# Patient Record
Sex: MALE | Race: WHITE | NOT HISPANIC OR LATINO | Employment: OTHER | URBAN - METROPOLITAN AREA
[De-identification: names, ages, dates, MRNs, and addresses within clinical notes are randomized per-mention and may not be internally consistent; named-entity substitution may affect disease eponyms.]

---

## 2021-03-23 ENCOUNTER — TRANSFERRED RECORDS (OUTPATIENT)
Dept: HEALTH INFORMATION MANAGEMENT | Facility: CLINIC | Age: 56
End: 2021-03-23

## 2021-04-01 ENCOUNTER — TRANSFERRED RECORDS (OUTPATIENT)
Dept: HEALTH INFORMATION MANAGEMENT | Facility: CLINIC | Age: 56
End: 2021-04-01

## 2021-04-20 ENCOUNTER — TRANSFERRED RECORDS (OUTPATIENT)
Dept: HEALTH INFORMATION MANAGEMENT | Facility: CLINIC | Age: 56
End: 2021-04-20

## 2021-04-20 ENCOUNTER — MEDICAL CORRESPONDENCE (OUTPATIENT)
Dept: HEALTH INFORMATION MANAGEMENT | Facility: CLINIC | Age: 56
End: 2021-04-20

## 2021-04-21 ENCOUNTER — TRANSCRIBE ORDERS (OUTPATIENT)
Dept: OTHER | Age: 56
End: 2021-04-21

## 2021-04-21 DIAGNOSIS — H53.2 DIPLOPIA: Primary | ICD-10-CM

## 2021-04-21 DIAGNOSIS — H40.003 GLAUCOMA SUSPECT, BILATERAL: ICD-10-CM

## 2021-04-21 DIAGNOSIS — H35.033 HYPERTENSIVE RETINOPATHY OF BOTH EYES: ICD-10-CM

## 2021-05-25 ENCOUNTER — TELEPHONE (OUTPATIENT)
Dept: NEUROSURGERY | Facility: CLINIC | Age: 56
End: 2021-05-25

## 2021-06-25 ENCOUNTER — OFFICE VISIT (OUTPATIENT)
Dept: NEUROSURGERY | Facility: CLINIC | Age: 56
End: 2021-06-25
Payer: COMMERCIAL

## 2021-06-25 VITALS
HEART RATE: 80 BPM | SYSTOLIC BLOOD PRESSURE: 111 MMHG | WEIGHT: 240 LBS | DIASTOLIC BLOOD PRESSURE: 70 MMHG | RESPIRATION RATE: 16 BRPM

## 2021-06-25 DIAGNOSIS — M79.601 RIGHT ARM PAIN: Primary | ICD-10-CM

## 2021-06-25 PROCEDURE — 99214 OFFICE O/P EST MOD 30 MIN: CPT | Performed by: NEUROLOGICAL SURGERY

## 2021-06-25 RX ORDER — LISINOPRIL AND HYDROCHLOROTHIAZIDE 20; 25 MG/1; MG/1
TABLET ORAL
COMMUNITY
Start: 2020-09-14

## 2021-06-25 RX ORDER — LORATADINE 10 MG/1
TABLET ORAL
COMMUNITY
Start: 2021-04-15

## 2021-06-25 RX ORDER — ASCORBIC ACID 500 MG
TABLET ORAL
COMMUNITY
Start: 2020-09-30

## 2021-06-25 RX ORDER — BUPROPION HYDROCHLORIDE 100 MG/1
100 TABLET ORAL
COMMUNITY

## 2021-06-25 RX ORDER — TESTOSTERONE CYPIONATE 200 MG/ML
INJECTION, SOLUTION INTRAMUSCULAR
COMMUNITY
Start: 2021-04-06

## 2021-06-25 RX ORDER — SERTRALINE HYDROCHLORIDE 100 MG/1
TABLET, FILM COATED ORAL
COMMUNITY
Start: 2021-06-01

## 2021-06-25 RX ORDER — BUSPIRONE HYDROCHLORIDE 5 MG/1
5 TABLET ORAL
COMMUNITY

## 2021-06-25 RX ORDER — GABAPENTIN 300 MG/1
CAPSULE ORAL
COMMUNITY
Start: 2021-04-06

## 2021-06-25 RX ORDER — CHLORAL HYDRATE 500 MG
2 CAPSULE ORAL
COMMUNITY

## 2021-06-25 RX ORDER — TAMSULOSIN HYDROCHLORIDE 0.4 MG/1
CAPSULE ORAL
COMMUNITY
Start: 2021-05-26

## 2021-06-25 RX ORDER — MULTIVITAMIN,THERAPEUTIC
1 TABLET ORAL
COMMUNITY

## 2021-06-25 RX ORDER — ACETAMINOPHEN 500 MG
TABLET ORAL
COMMUNITY
Start: 2020-09-14

## 2021-06-25 RX ORDER — ATORVASTATIN CALCIUM 40 MG/1
TABLET, FILM COATED ORAL
COMMUNITY
Start: 2020-09-14

## 2021-06-25 RX ORDER — ATENOLOL 25 MG/1
TABLET ORAL
COMMUNITY
Start: 2020-09-14

## 2021-06-25 NOTE — LETTER
6/25/2021         RE: Chiki Choe  [address]  [place] [address]        Dear Colleague,    Thank you for referring your patient, Chiki Choe, to the [facility] NEUROSURGERY CLINIC [place]. Please see a copy of my visit note below.    Mr. Wallace is a 55-year-old man seen today for evaluation of right-sided neck pain extending into the dorsum of his right shoulder.  His history is complicated by prior cervical surgery for reportedly the same symptoms 3 years ago.  And that surgery performed in [place] by Dr. Hannah a two-level cervical arthroplasty was performed at C4-5 and C5-6 reportedly.  Patient notes that subsequent to surgery that her symptoms transiently improved but then regressed to the previous level of severity.  He denies any significant interval change in the character or severity of his symptoms.  His problem is further complicated by significant bilateral shoulder pathology.  He has recently undergone a rotator cuff repair on the right shoulder and is still wearing a sling.  This significantly limits the neurologic examination available presently.  He also reports that as a consequence of his cervical surgery that he suffered a paralyzed right hemidiaphragm reportedly diagnosed with thoracic fluoroscopy.  He has not returned to see the operating surgeon since the time of surgery.    He localizes his pain to the right lateral cervical region extending near the clavicle and anterior dorsum of his right shoulder.  There is also some intermittent extension along his right anterior biceps but no spread below the elbow.  He notes intermittent numbness into the last 2 fingers of his right hand at night when he is wearing the sling, almost certainly a transient issue with his ulnar nerve.  He has minimal complaint of symptoms toward the left arm and denies any symptoms in the left C8 or ulnar distribution toward the left side.    Neurologic examination is  mostly deferred today because of the inability to complete a meaningful motor and sensory examination secondary to his recent right shoulder surgery.    We were able to obtain a cervical MRI scan obtained at [facility] in May 2021.  There is moderate magnetic artifact from the presence of cervical arthroplasty, Mobi-C implants, at C4-5 and C5-6.  On axial images he appears to have persistent moderate cervical foraminal stenosis toward the right side at both levels.  There may be a persistent osteophyte or chronic disc herniation across the canal at the C4-5 level although this is difficult to distinguish because of magnetic artifact.  He appears to have significant left C7-T1 foraminal stenosis.  There does not appear to be a clinical correlate to this radiographic finding.    Assessment: Persistent chronic proximal right shoulder and neck pain with some extension into the proximal right upper extremity of undetermined etiology in this individual with history of significant right shoulder pathology, recent rotator cuff surgery as well as reportedly ineffective decompression with placement of cervical arthroplasty at C4-5 and C5-6.  I have reviewed the differential diagnosis and management alternatives at some length with the patient and in the office.  I have recommended that we initially proceed with a noncontrast cervical CT scan to better understand the bony confines of the neural foramen at C4-5 and C5-6 as well as to get a better picture of the potential osteophyte across the canal at C4-5.  I told him that I believe it is unlikely that additional surgical intervention will be helpful then reassured him that his alignment as best can be determined on the cervical MRI scan and his spinal canal appear nearly normal.  There is no evidence of cord impingement or significant cervical spinal canal stenosis and therefore no possibility of progressive or devastating neurologic deficit in the future.    I will plan to  see him back in the office as soon as the cervical CT scan is available for review.    It is noted that I had previously operated on his wife years ago for a cervical problem and she will likely accompany him to the next office visit.    Approximately 25 minutes was spent direct contact with the patient the majority reviewing clinical and radiographic findings, history, differential diagnosis, management alternatives, risks and benefits.      Again, thank you for allowing me to participate in the care of your patient.        Sincerely,        Gonzalez Solano MD

## 2021-06-25 NOTE — PROGRESS NOTES
Mr. Wallace is a 55-year-old man seen today for evaluation of right-sided neck pain extending into the dorsum of his right shoulder.  His history is complicated by prior cervical surgery for reportedly the same symptoms 3 years ago.  And that surgery performed in [place] by Dr. Hannah a two-level cervical arthroplasty was performed at C4-5 and C5-6 reportedly.  Patient notes that subsequent to surgery that her symptoms transiently improved but then regressed to the previous level of severity.  He denies any significant interval change in the character or severity of his symptoms.  His problem is further complicated by significant bilateral shoulder pathology.  He has recently undergone a rotator cuff repair on the right shoulder and is still wearing a sling.  This significantly limits the neurologic examination available presently.  He also reports that as a consequence of his cervical surgery that he suffered a paralyzed right hemidiaphragm reportedly diagnosed with thoracic fluoroscopy.  He has not returned to see the operating surgeon since the time of surgery.    He localizes his pain to the right lateral cervical region extending near the clavicle and anterior dorsum of his right shoulder.  There is also some intermittent extension along his right anterior biceps but no spread below the elbow.  He notes intermittent numbness into the last 2 fingers of his right hand at night when he is wearing the sling, almost certainly a transient issue with his ulnar nerve.  He has minimal complaint of symptoms toward the left arm and denies any symptoms in the left C8 or ulnar distribution toward the left side.    Neurologic examination is mostly deferred today because of the inability to complete a meaningful motor and sensory examination secondary to his recent right shoulder surgery.    We were able to obtain a cervical MRI scan obtained at [facility] in May 2021.  There is moderate magnetic artifact from the  presence of cervical arthroplasty, Mobi-C implants, at C4-5 and C5-6.  On axial images he appears to have persistent moderate cervical foraminal stenosis toward the right side at both levels.  There may be a persistent osteophyte or chronic disc herniation across the canal at the C4-5 level although this is difficult to distinguish because of magnetic artifact.  He appears to have significant left C7-T1 foraminal stenosis.  There does not appear to be a clinical correlate to this radiographic finding.    Assessment: Persistent chronic proximal right shoulder and neck pain with some extension into the proximal right upper extremity of undetermined etiology in this individual with history of significant right shoulder pathology, recent rotator cuff surgery as well as reportedly ineffective decompression with placement of cervical arthroplasty at C4-5 and C5-6.  I have reviewed the differential diagnosis and management alternatives at some length with the patient and in the office.  I have recommended that we initially proceed with a noncontrast cervical CT scan to better understand the bony confines of the neural foramen at C4-5 and C5-6 as well as to get a better picture of the potential osteophyte across the canal at C4-5.  I told him that I believe it is unlikely that additional surgical intervention will be helpful then reassured him that his alignment as best can be determined on the cervical MRI scan and his spinal canal appear nearly normal.  There is no evidence of cord impingement or significant cervical spinal canal stenosis and therefore no possibility of progressive or devastating neurologic deficit in the future.    I will plan to see him back in the office as soon as the cervical CT scan is available for review.    It is noted that I had previously operated on his wife years ago for a cervical problem and she will likely accompany him to the next office visit.    Approximately 25 minutes was spent direct  contact with the patient the majority reviewing clinical and radiographic findings, history, differential diagnosis, management alternatives, risks and benefits.

## 2021-07-13 ENCOUNTER — OFFICE VISIT (OUTPATIENT)
Dept: NEUROSURGERY | Facility: CLINIC | Age: 56
End: 2021-07-13
Attending: NEUROLOGICAL SURGERY
Payer: COMMERCIAL

## 2021-07-13 VITALS
OXYGEN SATURATION: 95 % | HEART RATE: 82 BPM | WEIGHT: 240 LBS | SYSTOLIC BLOOD PRESSURE: 112 MMHG | RESPIRATION RATE: 16 BRPM | DIASTOLIC BLOOD PRESSURE: 72 MMHG

## 2021-07-13 DIAGNOSIS — M47.812 CERVICAL SPONDYLOSIS: Primary | ICD-10-CM

## 2021-07-13 PROCEDURE — 99213 OFFICE O/P EST LOW 20 MIN: CPT | Performed by: NEUROLOGICAL SURGERY

## 2021-07-13 PROCEDURE — G0463 HOSPITAL OUTPT CLINIC VISIT: HCPCS

## 2021-07-13 ASSESSMENT — PAIN SCALES - GENERAL: PAINLEVEL: NO PAIN (1)

## 2021-07-13 NOTE — PATIENT INSTRUCTIONS
Patient Next Steps:      Dr. Solano would like to see you back in the clinic for follow up in 1 year. He would like you to obtain a non-contract cervical CT prior to your visit. Please call the number below to schedule next July.       Please call us if you have any further questions or concerns.    [facility]   Phone: [phone]  Fax: [phone]

## 2021-07-13 NOTE — LETTER
7/13/2021         RE: Chiki Choe  [address]  [place] [address]        Dear Colleague,    Thank you for referring your patient, Chiki Choe, to the [facility] NEUROSURGERY CLINIC [place]. Please see a copy of my visit note below.    Mr. Suraez's returns following a noncontrast CT scan of his cervical spine.  This study is reviewed today and shows implanted cervical arthroplasty at C4-5 and C5-6 which appear to be solidly implanted.  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  This produces moderate spinal canal stenosis but no current evidence clinically or radiographically of cervical myelopathy.  Preoperative MRI studies are not available for review.  There is also noted to be a small osteophyte at right C3-4 which may have produced some of the patient's symptoms into the right shoulder and neck in the past.  Fortunately, currently has complaints of neck and right arm pain are quite minimal and intermittent.  He denies functionally limiting pain or neurologic deficit involving his dominant right upper extremity.  He is dealing with several other issues including intractable right knee pain and recent shoulder surgery from which he is making a good recovery.    I reviewed the clinical and radiographic findings in detail with the patient today in the office.  I told him that based upon his clinical status and lack of significant or continuing symptoms that I would not recommend additional surgery.  Potentially, if this were at C4-5 continues to propagate, removal of the arthroplasty at that level with at least a partial vertebrectomy may be needed.  I have asked him to return in 1 year with a repeat noncontrast cervical CT scan at that time to allow radiographic comparison and  clinical follow-up to ensure he is not developing myelopathy.    Approximately 20 minutes was spent in total reviewing the recent cervical CT scan and discussing management alternatives, risks and benefits.      Again, thank you for allowing me to participate in the care of your patient.        Sincerely,        Gonzalez Solano MD

## 2021-07-13 NOTE — PROGRESS NOTES
Mr. Suarez's returns following a noncontrast CT scan of his cervical spine.  This study is reviewed today and shows implanted cervical arthroplasty at C4-5 and C5-6 which appear to be solidly implanted.  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  This produces moderate spinal canal stenosis but no current evidence clinically or radiographically of cervical myelopathy.  Preoperative MRI studies are not available for review.  There is also noted to be a small osteophyte at right C3-4 which may have produced some of the patient's symptoms into the right shoulder and neck in the past.  Fortunately, currently has complaints of neck and right arm pain are quite minimal and intermittent.  He denies functionally limiting pain or neurologic deficit involving his dominant right upper extremity.  He is dealing with several other issues including intractable right knee pain and recent shoulder surgery from which he is making a good recovery.    I reviewed the clinical and radiographic findings in detail with the patient today in the office.  I told him that based upon his clinical status and lack of significant or continuing symptoms that I would not recommend additional surgery.  Potentially, if this were at C4-5 continues to propagate, removal of the arthroplasty at that level with at least a partial vertebrectomy may be needed.  I have asked him to return in 1 year with a repeat noncontrast cervical CT scan at that time to allow radiographic comparison and clinical follow-up to ensure he is not developing myelopathy.    Approximately 20 minutes was spent in total reviewing the recent cervical CT scan and discussing management alternatives, risks and benefits.

## 2021-07-19 ENCOUNTER — TRANSFERRED RECORDS (OUTPATIENT)
Dept: HEALTH INFORMATION MANAGEMENT | Facility: CLINIC | Age: 56
End: 2021-07-19

## 2021-07-19 ENCOUNTER — MEDICAL CORRESPONDENCE (OUTPATIENT)
Dept: HEALTH INFORMATION MANAGEMENT | Facility: CLINIC | Age: 56
End: 2021-07-19

## 2021-08-26 ENCOUNTER — OFFICE VISIT (OUTPATIENT)
Dept: OPHTHALMOLOGY | Facility: CLINIC | Age: 56
End: 2021-08-26
Attending: OPHTHALMOLOGY
Payer: COMMERCIAL

## 2021-08-26 DIAGNOSIS — H40.003 GLAUCOMA SUSPECT, BILATERAL: ICD-10-CM

## 2021-08-26 DIAGNOSIS — H35.033 HYPERTENSIVE RETINOPATHY OF BOTH EYES: ICD-10-CM

## 2021-08-26 DIAGNOSIS — H53.2 DIPLOPIA: ICD-10-CM

## 2021-08-26 DIAGNOSIS — H53.10 SUBJECTIVE VISUAL DISTURBANCE: Primary | ICD-10-CM

## 2021-08-26 DIAGNOSIS — H50.612 BROWN SYNDROME, LEFT EYE: Primary | ICD-10-CM

## 2021-08-26 PROCEDURE — 92060 SENSORIMOTOR EXAMINATION: CPT | Performed by: OPHTHALMOLOGY

## 2021-08-26 PROCEDURE — G0463 HOSPITAL OUTPT CLINIC VISIT: HCPCS | Mod: 25

## 2021-08-26 PROCEDURE — 99204 OFFICE O/P NEW MOD 45 MIN: CPT | Performed by: OPHTHALMOLOGY

## 2021-08-26 PROCEDURE — 92015 DETERMINE REFRACTIVE STATE: CPT

## 2021-08-26 ASSESSMENT — CONF VISUAL FIELD
OD_NORMAL: 1
OS_NORMAL: 1
METHOD: COUNTING FINGERS

## 2021-08-26 ASSESSMENT — MARGIN REFLEX DISTANCE
OS_MRD1: 0.5
OD_MRD1: 1

## 2021-08-26 ASSESSMENT — VISUAL ACUITY
METHOD: SNELLEN - LINEAR
OS_SC+: -1
OD_SC: 20/20
OD_SC+: -2
OS_SC: 20/20

## 2021-08-26 ASSESSMENT — CUP TO DISC RATIO
OS_RATIO: 0.5
OD_RATIO: 0.4

## 2021-08-26 ASSESSMENT — TONOMETRY
OD_IOP_MMHG: 09
OS_IOP_MMHG: 08
IOP_METHOD: ICARE

## 2021-08-26 ASSESSMENT — EXTERNAL EXAM - RIGHT EYE: OD_EXAM: NORMAL

## 2021-08-26 ASSESSMENT — SLIT LAMP EXAM - LIDS
COMMENTS: MODERATE BLEPHARITIS
COMMENTS: MODERATE BLEPHARITIS

## 2021-08-26 ASSESSMENT — EXTERNAL EXAM - LEFT EYE: OS_EXAM: NORMAL
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
## 2021-08-27 ENCOUNTER — TELEPHONE (OUTPATIENT)
Dept: OPHTHALMOLOGY | Facility: CLINIC | Age: 56
End: 2021-08-27

## 2021-08-27 NOTE — TELEPHONE ENCOUNTER
Spoke to patient.  Dr. Franco spoke with the strabismus surgeon and unfortunately surgery is not an option.  Patient should call us if he has worsening of symptoms.  Otherwise will be in touch after the lab results come back.     Mary Mcneal on 8/27/2021 at 12:31 PM

## 2021-09-01 ENCOUNTER — TELEPHONE (OUTPATIENT)
Dept: OPHTHALMOLOGY | Facility: CLINIC | Age: 56
End: 2021-09-01

## 2021-09-01 DIAGNOSIS — H50.612 BROWN SYNDROME, LEFT EYE: Primary | ICD-10-CM

## 2021-09-01 DIAGNOSIS — Z87.898 HISTORY OF ELEVATED ANTINUCLEAR ANTIBODY (ANA): ICD-10-CM

## 2021-09-01 NOTE — TELEPHONE ENCOUNTER
Left message for patient.  Rheumatoid factor lab was normal.  TREMAINE was only slightly elevated and Dr. Franco is not sure rheumatology would work up any further but can see if patient would like.  Asked him to call me if he would like to see rheumatology.     Mary Mcneal on 9/1/2021 at 3:53 PM

## 2021-09-02 NOTE — TELEPHONE ENCOUNTER
Spoke to patient.  He is interested in rheumatology referral.  He would like to go closer to his home in [place]  Order placed and faxed per request.     Mary Mcneal on 9/2/2021 at 12:47 PM

## 2021-10-11 ENCOUNTER — HEALTH MAINTENANCE LETTER (OUTPATIENT)
Age: 56
End: 2021-10-11

## 2022-07-01 ENCOUNTER — TELEPHONE (OUTPATIENT)
Dept: NEUROSURGERY | Facility: CLINIC | Age: 57
End: 2022-07-01

## 2022-07-01 DIAGNOSIS — M54.12 CERVICAL RADICULOPATHY: Primary | ICD-10-CM

## 2022-07-01 NOTE — TELEPHONE ENCOUNTER
Order non-contrast CT cervical and remind pt to schedule rita Solano to review results. Order placed and referral faxed to [facility] as per patient's request.     Attempted to reach out to patient, no answer. Left voice message for patient to call clinic back and let us know when CT gets scheduled so we can get images and coordinate appointment with Dr Solano.

## 2022-07-01 NOTE — TELEPHONE ENCOUNTER
Faxed CT Cervical referral to the [facility] July 1, 2022 to fax number [phone]    Right Fax confirmed at 104 PM    Attempted to reach out to patient, no answer. Left voice message for patient to call clinic back if any issues.

## 2022-07-01 NOTE — TELEPHONE ENCOUNTER
Patient called stating needs a 1 yr follow up with surgeon, how ever needs the CT order to be sent to Dr. Fredy Tom at [facility]. Phone [phone].   Please contact Edison when done [phone].   Thank you

## 2022-07-19 ENCOUNTER — OFFICE VISIT (OUTPATIENT)
Dept: NEUROSURGERY | Facility: CLINIC | Age: 57
End: 2022-07-19
Payer: COMMERCIAL

## 2022-07-19 VITALS
OXYGEN SATURATION: 96 % | HEART RATE: 91 BPM | BODY MASS INDEX: 36.14 KG/M2 | SYSTOLIC BLOOD PRESSURE: 102 MMHG | DIASTOLIC BLOOD PRESSURE: 69 MMHG | HEIGHT: 69 IN | WEIGHT: 244 LBS

## 2022-07-19 DIAGNOSIS — M54.12 CERVICAL RADICULOPATHY: ICD-10-CM

## 2022-07-19 DIAGNOSIS — M47.812 CERVICAL SPONDYLOSIS: Primary | ICD-10-CM

## 2022-07-19 PROCEDURE — 99213 OFFICE O/P EST LOW 20 MIN: CPT | Performed by: NEUROLOGICAL SURGERY

## 2022-07-19 ASSESSMENT — PAIN SCALES - GENERAL: PAINLEVEL: NO PAIN (1)

## 2022-07-19 NOTE — NURSING NOTE
"Chiki Choe is a 56 year old male who presents for:  Chief Complaint   Patient presents with     Consult     Cervical radiculopathy        Initial Vitals:  /69   Pulse 91   Ht 5' 9\" (1.753 m)   Wt 244 lb (110.7 kg)   SpO2 96%   BMI 36.03 kg/m   Estimated body mass index is 36.03 kg/m  as calculated from the following:    Height as of this encounter: 5' 9\" (1.753 m).    Weight as of this encounter: 244 lb (110.7 kg).. Body surface area is 2.32 meters squared. BP completed using cuff size: large  No Pain (1)    Nursing Comments:     Nino Johnson    "

## 2022-07-19 NOTE — PROGRESS NOTES
Mr. Choe is seen in scheduled follow-up for radiographic review of the cervical CT scan.  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  He has multiple complaints of difficulty with ambulation, weakness of his upper extremities, etc.  These appear to be longstanding problems and there has been no reported new weakness or recent functional limitation.  By history there is no indication of myelopathic symptoms and no complaint of symptoms consistent with upper extremity radiculopathy.  He has some what limited cervical range of motion with minimal chronic pain complaint.  This also appears to be unchanged.    On examination, he is awake alert and oriented x3 with memory intact for recent and remote events and speech clear in character and content.  Cervical range of motion is slightly limited in flexion and rotation but is nonpainful.  He reports that this limitation has not changed.  Gait is normal without ataxic component.  He is capable of tandem gait.  Examination of his bilateral upper extremities shows 5/5 strength throughout including hand intrinsics, biceps, triceps and brachioradialis bilaterally.  Deltoids are also equal and strong.  There is no evidence of dermatomal sensory disturbance.  Lower extremity strength is intact.  Deep tendon reflexes throughout are 1+ and equal, nearly absent at the ankles bilaterally and symmetrically.  There is no ankle clonus noted and plantar signs are absent.    A recent noncontrast cervical CT scan on July 1, 2022 is reviewed and compared to a similar study from June 25, 2021.  There is ossification of the residual posterior longitudinal ligament behind the arthroplasty at C4-5 on the right side.  This is central and slightly eccentric toward the right side.  It does not appear to produce severe spinal canal stenosis nor foraminal stenosis.  In  comparison with study obtained here earlier this calcific density appears to have consolidated slightly and may be 1 to 2 mm thicker than previously.    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  The operating surgeon at that time reported an autofusion at the C4-5 level but persisted with arthroplasty placement anyway.  It appears that there is no residual possibility of motion at the C4-5 level and that this level has effectively fused.  At the C5-6 level I believe the arthroplasty is likely functioning.    And there is no clinical evidence of cervical radiculopathy or myelopathy and the patient's function remains quite good.  I have reviewed the clinical and radiographic findings with him in detail and discussed management alternatives once again.  I told him that removal of the ossification behind the C4-5 arthroplasty would require partial vertebrectomies of C4 and C5 likely with placement of a strut graft and an anterior cervical plate followed by at least 3 to 4 months off work.  Given the absence of significant symptoms or functional limitations patient is understandably reluctant to consider additional surgery.  I have reviewed symptoms and signs of cervical myelopathy with him and told him that should he develop difficulty with ambulation, fine motor movement or strength in his upper extremities or return of radicular symptoms into his arms that he should call or return to the office for reevaluation without delay.  Otherwise, we will plan on a repeat cervical CT scan in 1 year to allow comparison study.  When this calcification appears stable, the need for further radiographic follow-up will be unnecessary unless the patient is developing symptoms potentially related to this problem.    Approximately 25 minutes in total was spent with Mr. Suaerz's the majority reviewing the clinical record, evaluating him and discussing differential diagnosis and  management alternatives, risks and benefits.

## 2022-07-19 NOTE — LETTER
7/19/2022         RE: Chiki Choe  [address]  [place] [address]        Dear Colleague,    Thank you for referring your patient, Chiki Choe, to the [facility] NEUROLOGY CLINICS [facility]. Please see a copy of my visit note below.    Mr. Choe is seen in scheduled follow-up for radiographic review of the cervical CT scan.  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  He has multiple complaints of difficulty with ambulation, weakness of his upper extremities, etc.  These appear to be longstanding problems and there has been no reported new weakness or recent functional limitation.  By history there is no indication of myelopathic symptoms and no complaint of symptoms consistent with upper extremity radiculopathy.  He has some what limited cervical range of motion with minimal chronic pain complaint.  This also appears to be unchanged.    On examination, he is awake alert and oriented x3 with memory intact for recent and remote events and speech clear in character and content.  Cervical range of motion is slightly limited in flexion and rotation but is nonpainful.  He reports that this limitation has not changed.  Gait is normal without ataxic component.  He is capable of tandem gait.  Examination of his bilateral upper extremities shows 5/5 strength throughout including hand intrinsics, biceps, triceps and brachioradialis bilaterally.  Deltoids are also equal and strong.  There is no evidence of dermatomal sensory disturbance.  Lower extremity strength is intact.  Deep tendon reflexes throughout are 1+ and equal, nearly absent at the ankles bilaterally and symmetrically.  There is no ankle clonus noted and plantar signs are absent.    A recent noncontrast cervical CT scan on July 1, 2022 is reviewed and compared to a similar study from June 25,  2021.  There is ossification of the residual posterior longitudinal ligament behind the arthroplasty at C4-5 on the right side.  This is central and slightly eccentric toward the right side.  It does not appear to produce severe spinal canal stenosis nor foraminal stenosis.  In comparison with study obtained here earlier this calcific density appears to have consolidated slightly and may be 1 to 2 mm thicker than previously.    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  The operating surgeon at that time reported an autofusion at the C4-5 level but persisted with arthroplasty placement anyway.  It appears that there is no residual possibility of motion at the C4-5 level and that this level has effectively fused.  At the C5-6 level I believe the arthroplasty is likely functioning.    And there is no clinical evidence of cervical radiculopathy or myelopathy and the patient's function remains quite good.  I have reviewed the clinical and radiographic findings with him in detail and discussed management alternatives once again.  I told him that removal of the ossification behind the C4-5 arthroplasty would require partial vertebrectomies of C4 and C5 likely with placement of a strut graft and an anterior cervical plate followed by at least 3 to 4 months off work.  Given the absence of significant symptoms or functional limitations patient is understandably reluctant to consider additional surgery.  I have reviewed symptoms and signs of cervical myelopathy with him and told him that should he develop difficulty with ambulation, fine motor movement or strength in his upper extremities or return of radicular symptoms into his arms that he should call or return to the office for reevaluation without delay.  Otherwise, we will plan on a repeat cervical CT scan in 1 year to allow comparison study.  When this calcification appears stable, the need for further radiographic  follow-up will be unnecessary unless the patient is developing symptoms potentially related to this problem.    Approximately 25 minutes in total was spent with Mr. Suarez's the majority reviewing the clinical record, evaluating him and discussing differential diagnosis and management alternatives, risks and benefits.      Again, thank you for allowing me to participate in the care of your patient.        Sincerely,        Gonzalez Solano MD

## 2022-09-25 ENCOUNTER — HEALTH MAINTENANCE LETTER (OUTPATIENT)
Age: 57
End: 2022-09-25

## 2023-01-30 ENCOUNTER — HEALTH MAINTENANCE LETTER (OUTPATIENT)
Age: 58
End: 2023-01-30

## 2023-06-01 NOTE — CONFIDENTIAL NOTE
Patient was last seen in clinic by Dr. Solano on 7/19/2022 refer to note for details.     Per Dr. Solano, patient is due for repeat cervical CT w/o contrast in July 2023.     Need to update and clarify with patient where he would like to obtain the cervical CT. Has had imaging previously at [facility] in [place] and at the [facility].     Attempted to reach out to patient, no answer. Left voice message asking patient to call clinic back to further discuss.     Place order accordingly once patient calls back on location he wants it done at.

## 2023-06-29 ENCOUNTER — TELEPHONE (OUTPATIENT)
Dept: NEUROSURGERY | Facility: CLINIC | Age: 58
End: 2023-06-29

## 2023-06-29 DIAGNOSIS — M47.812 CERVICAL SPONDYLOSIS: Primary | ICD-10-CM

## 2023-06-29 DIAGNOSIS — M54.12 CERVICAL RADICULOPATHY: ICD-10-CM

## 2023-06-29 NOTE — TELEPHONE ENCOUNTER
[facility].

## 2023-06-29 NOTE — CONFIDENTIAL NOTE
Order placed for repeat cervical CT w/o contrast per chart review.     Encounter routed to [facility] team to assist with faxing.

## 2023-07-06 ENCOUNTER — ANCILLARY PROCEDURE (OUTPATIENT)
Dept: CT IMAGING | Facility: CLINIC | Age: 58
End: 2023-07-06
Attending: NEUROLOGICAL SURGERY
Payer: COMMERCIAL

## 2023-07-06 DIAGNOSIS — M54.12 CERVICAL RADICULOPATHY: ICD-10-CM

## 2023-07-06 DIAGNOSIS — M47.812 CERVICAL SPONDYLOSIS: ICD-10-CM

## 2023-07-06 PROCEDURE — 72125 CT NECK SPINE W/O DYE: CPT | Performed by: RADIOLOGY

## 2023-07-28 ENCOUNTER — OFFICE VISIT (OUTPATIENT)
Dept: NEUROSURGERY | Facility: CLINIC | Age: 58
End: 2023-07-28
Payer: COMMERCIAL

## 2023-07-28 VITALS
BODY MASS INDEX: 36.14 KG/M2 | WEIGHT: 244 LBS | HEIGHT: 69 IN | SYSTOLIC BLOOD PRESSURE: 101 MMHG | HEART RATE: 88 BPM | DIASTOLIC BLOOD PRESSURE: 71 MMHG

## 2023-07-28 DIAGNOSIS — Z98.890 S/P CERVICAL DISC REPLACEMENT: Primary | ICD-10-CM

## 2023-07-28 PROCEDURE — 99213 OFFICE O/P EST LOW 20 MIN: CPT | Performed by: PHYSICIAN ASSISTANT

## 2023-07-28 ASSESSMENT — PAIN SCALES - GENERAL: PAINLEVEL: SEVERE PAIN (6)

## 2023-07-28 NOTE — LETTER
7/28/2023         RE: Chiki Choe  [address]  [place] [address]        Dear Colleague,    Thank you for referring your patient, Chiki Choe, to the [facility] NEUROSURGERY CLINIC [place]. Please see a copy of my visit note below.    Neurosurgery follow-up    Mr. 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  He was seen 1 year ago by Dr. Solano, recommended follow-up for repeat CT scan to ensure stability of the ossification.  Patient states he has had no new symptoms symptoms new symptoms of myelopathy and no change in his symptoms.  Overall he is not very bothered by any neck symptoms at this time.  He states he is having some right arm pain, and is going to be undergoing an EMG soon.    He denies loss of dexterity in his fingers or loss coordination of his lower extremities, denies Lhermitte's phenomenon.    Exam    B/P: 101/71, T: Data Unavailable, P: 88, R: Data Unavailable     Alert and oriented no acute distress  Bilateral upper extremities with 5/5 strength  Cj sign negative  Reflexes 2+ triceps, biceps, brachioradialis    Bilateral lower extremities with 5/5 strength  Reflexes 2+ patella/ankle  Negative straight leg raise bilaterally  Negative ankle clonus negative Babinski bilaterally  Gait is normal    Imaging    Cervical CT scan demonstrates stable appearing ossification of the right central longitudinal ligament at C4-5.  Mild foraminal stenosis on the right at C4-5.    Assessment      Patient daily might be an resolved in status post cervical arthroplasty at C4-5 and C5-6, ossification at C4-5 is stable.  No symptoms of myelopathy.    Plan    Imaging is stable, no further follow-up imaging is necessary unless patient begins to develop any symptoms of myelopathy, which we discussed in clinic today.   Follow-up as needed.          Again, thank you for allowing me to participate in the care of your patient.        Sincerely,        Toan Montanez PA-C

## 2023-07-28 NOTE — PROGRESS NOTES
Neurosurgery follow-up    Mr. 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  He was seen 1 year ago by Dr. Solano, recommended follow-up for repeat CT scan to ensure stability of the ossification.  Patient states he has had no new symptoms symptoms new symptoms of myelopathy and no change in his symptoms.  Overall he is not very bothered by any neck symptoms at this time.  He states he is having some right arm pain, and is going to be undergoing an EMG soon.    He denies loss of dexterity in his fingers or loss coordination of his lower extremities, denies Lhermitte's phenomenon.    Exam    B/P: 101/71, T: Data Unavailable, P: 88, R: Data Unavailable     Alert and oriented no acute distress  Bilateral upper extremities with 5/5 strength  Cj sign negative  Reflexes 2+ triceps, biceps, brachioradialis    Bilateral lower extremities with 5/5 strength  Reflexes 2+ patella/ankle  Negative straight leg raise bilaterally  Negative ankle clonus negative Babinski bilaterally  Gait is normal    Imaging    Cervical CT scan demonstrates stable appearing ossification of the right central longitudinal ligament at C4-5.  Mild foraminal stenosis on the right at C4-5.    Assessment      Patient daily might be an resolved in status post cervical arthroplasty at C4-5 and C5-6, ossification at C4-5 is stable.  No symptoms of myelopathy.    Plan    Imaging is stable, no further follow-up imaging is necessary unless patient begins to develop any symptoms of myelopathy, which we discussed in clinic today.  Follow-up as needed.

## 2023-07-28 NOTE — NURSING NOTE
"Chikidanielle Choe's goals for this visit include:   Chief Complaint   Patient presents with    RECHECK     CT completed 7/6/23// Follow up Cervical radiculopathy   [facility]   Cerrvical and lumbar radiculaopathy       He requests these members of his care team be copied on today's visit information: yes    PCP: No Ref-Primary, Physician    Referring Provider:  No referring provider defined for this encounter.    /71   Pulse 88   Ht 1.753 m (5' 9\")   Wt 110.7 kg (244 lb)   BMI 36.03 kg/m      Do you need any medication refills at today's visit? No  BETY Albarran, LEEANN ([facility])      "

## 2023-09-20 ENCOUNTER — MEDICAL CORRESPONDENCE (OUTPATIENT)
Dept: HEALTH INFORMATION MANAGEMENT | Facility: CLINIC | Age: 58
End: 2023-09-20

## 2023-11-14 ENCOUNTER — TRANSCRIBE ORDERS (OUTPATIENT)
Dept: OTHER | Age: 58
End: 2023-11-14

## 2023-11-14 DIAGNOSIS — R13.10 DYSPHAGIA, UNSPECIFIED TYPE: Primary | ICD-10-CM

## 2023-11-16 ENCOUNTER — TELEPHONE (OUTPATIENT)
Dept: GASTROENTEROLOGY | Facility: CLINIC | Age: 58
End: 2023-11-16
Payer: COMMERCIAL

## 2023-11-16 NOTE — TELEPHONE ENCOUNTER
"Endoscopy Scheduling Screen    Have you had a positive Covid test in the last 14 days?  No    Are you active on MyChart?   Yes    What insurance is in the chart?  Other:  [facility]    Ordering/Referring Provider: Jimi Harris MD   (If ordering provider performs procedure, schedule with ordering provider unless otherwise instructed. )    BMI: Estimated body mass index is 36.03 kg/m  as calculated from the following:    Height as of 7/28/23: 1.753 m (5' 9\").    Weight as of 7/28/23: 110.7 kg (244 lb).     Sedation Ordered  MAC/deep sedation.   BMI<= 45 45 < BMI <= 48 48 < BMI < = 50  BMI > 50   No Restrictions No [facility] ASC  No [facility]  [facility] [facility] with exceptions Hospital Only OR Only       Are you taking any prescription medications for pain 3 or more times per week?   No    Do you have a history of malignant hyperthermia or adverse reaction to anesthesia?  No     Have you been diagnosed or told you have pulmonary hypertension?   No    Do you have an LVAD?  No    Have you been told you have moderate to severe sleep apnea?  Yes (RN Review required for scheduling unless scheduling in Hospital.)    Have you been told you have COPD, asthma, or any other lung disease?  No - half of diaphragm paralyzed    Do you have any heart conditions?  No     Have you ever had an organ transplant?   No    Have you ever had or are you awaiting a heart or lung transplant?   No    Have you had a stroke or transient ischemic attack (TIA aka \"mini stroke\" in the last 6 months?   No    Have you been diagnosed with or been told you have cirrhosis of the liver?   No    Are you currently on dialysis?   No    Do you need assistance transferring?   No    BMI: Estimated body mass index is 36.03 kg/m  as calculated from the following:    Height as of 7/28/23: 1.753 m (5' 9\").    Weight as of 7/28/23: 110.7 kg (244 lb).     Is patients BMI > 40 and scheduling location [facility]?  No    Do you take an injectable medication for weight loss or diabetes (excluding " insulin)?  No    Do you take the medication Naltrexone?  No    Do you take blood thinners?  No       Prep   Are you currently on dialysis or do you have chronic kidney disease?  No    Do you have a diagnosis of diabetes?  No    Do you have a diagnosis of cystic fibrosis (CF)?  No    On a regular basis do you go 3 -5 days between bowel movements?  No    BMI > 40?  No    Preferred Pharmacy:  [facility] - [place], [place] - [address]  [address] [address]  Phone: [phone] Fax: [phone]      Final Scheduling Details   Colonoscopy prep sent?  N/A    Procedure scheduled  Upper endoscopy with Endoflip    Surgeon:  LALA     Date of procedure:  3/26/2024     Pre-OP / PAC:   No - Not required for this site.    Location  [facility] - Per order.    Sedation   MAC/Deep Sedation - Per order.      Patient Reminders:   You will receive a call from a Nurse to review instructions and health history.  This assessment must be completed prior to your procedure.  Failure to complete the Nurse assessment may result in the procedure being cancelled.      On the day of your procedure, please designate an adult(s) who can drive you home stay with you for the next 24 hours. The medicines used in the exam will make you sleepy. You will not be able to drive.      You cannot take public transportation, ride share services, or non-medical taxi service without a responsible caregiver.  Medical transport services are allowed with the requirement that a responsible caregiver will receive you at your destination.  We require that drivers and caregivers are confirmed prior to your procedure.

## 2024-02-21 ENCOUNTER — TRANSCRIBE ORDERS (OUTPATIENT)
Dept: OTHER | Age: 59
End: 2024-02-21

## 2024-03-02 ENCOUNTER — HEALTH MAINTENANCE LETTER (OUTPATIENT)
Age: 59
End: 2024-03-02

## 2024-03-13 ENCOUNTER — TELEPHONE (OUTPATIENT)
Dept: GASTROENTEROLOGY | Facility: CLINIC | Age: 59
End: 2024-03-13
Payer: COMMERCIAL

## 2024-03-13 NOTE — TELEPHONE ENCOUNTER
Attempted to contact patient in order to complete pre assessment questions.     No answer. Left message to return call to [phone] option 4    Missed call communication sent via [email or web address].    Rubi Fonseca RN  Endoscopy Procedure Pre Assessment RN

## 2024-03-13 NOTE — TELEPHONE ENCOUNTER
Pre visit planning completed.      Procedure details:    Patient scheduled for Upper endoscopy (EGD) with Endoflip on 3/26/2024.     Arrival time: 0845. Procedure time 1015    Pre op exam needed? N/A    Facility location: [facility]; [address], [place] [address]    Sedation type: MAC    Indication for procedure: Dysphagia, unspecified type [R13.10]       Chart review:     Electronic implanted devices? No    Recent diagnosis of diverticulitis within the last 6 weeks? N/A    Diabetic? Yes.     Diabetic medication HOLDING recommendations: (if applicable)  Oral diabetic medications: Yes:  Jardiance (empagliflozin): HOLD  3 days before procedure.  Diabetic injectables: No  Insulin: Yes. Consult with managing provider       Medication review:    Anticoagulants? No    NSAIDS? No NSAID medications per patient's medication list.  RN will verify with pre-assessment call.    Other medication HOLDING recommendations:  EGD: PPIs/Acid reducing medication(s): HOLD 2 weeks before procedure. Due to reflux/GERD.      Prep for procedure:     Bowel prep recommendation: N/A    Prep instructions sent via [employer]         Rubi Fonseca RN  Endoscopy Procedure Pre Assessment RN  [phone] option 4

## 2024-03-15 NOTE — TELEPHONE ENCOUNTER
Pre assessment completed for upcoming procedure.   (Please see previous telephone encounter notes for complete details)    Patient  returned call.       Procedure details:    Arrival time and facility location reviewed.    Pre op exam needed? N/A    Designated  policy reviewed. Instructed to have someone stay 24  hours post procedure.       Medication review:    Oral diabetic medication(s): Jardiance (empagliflozin): HOLD  3 days before procedure.  Injectable diabetic medication(s): Ozempic (Semaglutide). Weekly dosing of medication.  Hold 7 days before procedure.  Follow up with managing provider.   Insulin.  Consult with your managing provider.       Prep for procedure:     Procedure prep instructions reviewed.        Any additional information needed:  N/A      Patient  verbalized understanding and had no questions or concerns at this time.      Zeinab Escamilla RN  Endoscopy Procedure Pre Assessment RN  [phone] option 4

## 2024-03-26 ENCOUNTER — ANESTHESIA EVENT (OUTPATIENT)
Dept: GASTROENTEROLOGY | Facility: CLINIC | Age: 59
End: 2024-03-26
Payer: COMMERCIAL

## 2024-03-26 ENCOUNTER — ANESTHESIA (OUTPATIENT)
Dept: GASTROENTEROLOGY | Facility: CLINIC | Age: 59
End: 2024-03-26
Payer: COMMERCIAL

## 2024-03-26 ENCOUNTER — HOSPITAL ENCOUNTER (OUTPATIENT)
Facility: CLINIC | Age: 59
Discharge: HOME | End: 2024-03-26
Attending: INTERNAL MEDICINE | Admitting: INTERNAL MEDICINE
Payer: COMMERCIAL

## 2024-03-26 VITALS — OXYGEN SATURATION: 95 % | SYSTOLIC BLOOD PRESSURE: 118 MMHG | DIASTOLIC BLOOD PRESSURE: 83 MMHG

## 2024-03-26 LAB — UPPER GI ENDOSCOPY: NORMAL

## 2024-03-26 PROCEDURE — 250N000011 HC RX IP 250 OP 636: Performed by: ANESTHESIOLOGY

## 2024-03-26 PROCEDURE — 250N000009 HC RX 250: Performed by: ANESTHESIOLOGY

## 2024-03-26 PROCEDURE — 370N000017 HC ANESTHESIA TECHNICAL FEE, PER MIN: Performed by: INTERNAL MEDICINE

## 2024-03-26 PROCEDURE — 88305 TISSUE EXAM BY PATHOLOGIST: CPT | Mod: TC | Performed by: INTERNAL MEDICINE

## 2024-03-26 PROCEDURE — 43239 EGD BIOPSY SINGLE/MULTIPLE: CPT | Performed by: ANESTHESIOLOGY

## 2024-03-26 PROCEDURE — 43248 EGD GUIDE WIRE INSERTION: CPT | Performed by: INTERNAL MEDICINE

## 2024-03-26 PROCEDURE — 91040 ESOPH BALLOON DISTENSION TST: CPT | Performed by: INTERNAL MEDICINE

## 2024-03-26 PROCEDURE — 43239 EGD BIOPSY SINGLE/MULTIPLE: CPT | Performed by: INTERNAL MEDICINE

## 2024-03-26 PROCEDURE — 258N000003 HC RX IP 258 OP 636: Performed by: ANESTHESIOLOGY

## 2024-03-26 PROCEDURE — 88305 TISSUE EXAM BY PATHOLOGIST: CPT | Mod: 26 | Performed by: PATHOLOGY

## 2024-03-26 PROCEDURE — 43239 EGD BIOPSY SINGLE/MULTIPLE: CPT | Performed by: STUDENT IN AN ORGANIZED HEALTH CARE EDUCATION/TRAINING PROGRAM

## 2024-03-26 RX ORDER — ONDANSETRON 4 MG/1
4 TABLET, ORALLY DISINTEGRATING ORAL EVERY 30 MIN PRN
Status: DISCONTINUED | OUTPATIENT
Start: 2024-03-26 | End: 2024-03-26 | Stop reason: HOSPADM

## 2024-03-26 RX ORDER — LIDOCAINE 40 MG/G
CREAM TOPICAL
Status: DISCONTINUED | OUTPATIENT
Start: 2024-03-26 | End: 2024-03-26 | Stop reason: HOSPADM

## 2024-03-26 RX ORDER — NALOXONE HYDROCHLORIDE 0.4 MG/ML
0.4 INJECTION, SOLUTION INTRAMUSCULAR; INTRAVENOUS; SUBCUTANEOUS
Status: DISCONTINUED | OUTPATIENT
Start: 2024-03-26 | End: 2024-03-26 | Stop reason: HOSPADM

## 2024-03-26 RX ORDER — ONDANSETRON 4 MG/1
4 TABLET, ORALLY DISINTEGRATING ORAL EVERY 6 HOURS PRN
Status: DISCONTINUED | OUTPATIENT
Start: 2024-03-26 | End: 2024-03-26 | Stop reason: HOSPADM

## 2024-03-26 RX ORDER — PROCHLORPERAZINE MALEATE 5 MG
10 TABLET ORAL EVERY 6 HOURS PRN
Status: DISCONTINUED | OUTPATIENT
Start: 2024-03-26 | End: 2024-03-26 | Stop reason: HOSPADM

## 2024-03-26 RX ORDER — SODIUM CHLORIDE, SODIUM LACTATE, POTASSIUM CHLORIDE, CALCIUM CHLORIDE 600; 310; 30; 20 MG/100ML; MG/100ML; MG/100ML; MG/100ML
INJECTION, SOLUTION INTRAVENOUS CONTINUOUS PRN
Status: DISCONTINUED | OUTPATIENT
Start: 2024-03-26 | End: 2024-03-26

## 2024-03-26 RX ORDER — NALOXONE HYDROCHLORIDE 0.4 MG/ML
0.2 INJECTION, SOLUTION INTRAMUSCULAR; INTRAVENOUS; SUBCUTANEOUS
Status: DISCONTINUED | OUTPATIENT
Start: 2024-03-26 | End: 2024-03-26 | Stop reason: HOSPADM

## 2024-03-26 RX ORDER — LIDOCAINE HYDROCHLORIDE 20 MG/ML
INJECTION, SOLUTION INFILTRATION; PERINEURAL PRN
Status: DISCONTINUED | OUTPATIENT
Start: 2024-03-26 | End: 2024-03-26

## 2024-03-26 RX ORDER — PROPOFOL 10 MG/ML
INJECTION, EMULSION INTRAVENOUS PRN
Status: DISCONTINUED | OUTPATIENT
Start: 2024-03-26 | End: 2024-03-26

## 2024-03-26 RX ORDER — ONDANSETRON 2 MG/ML
4 INJECTION INTRAMUSCULAR; INTRAVENOUS
Status: DISCONTINUED | OUTPATIENT
Start: 2024-03-26 | End: 2024-03-26 | Stop reason: HOSPADM

## 2024-03-26 RX ORDER — ONDANSETRON 2 MG/ML
4 INJECTION INTRAMUSCULAR; INTRAVENOUS EVERY 30 MIN PRN
Status: DISCONTINUED | OUTPATIENT
Start: 2024-03-26 | End: 2024-03-26 | Stop reason: HOSPADM

## 2024-03-26 RX ORDER — FLUMAZENIL 0.1 MG/ML
0.2 INJECTION, SOLUTION INTRAVENOUS
Status: DISCONTINUED | OUTPATIENT
Start: 2024-03-26 | End: 2024-03-26 | Stop reason: HOSPADM

## 2024-03-26 RX ORDER — OXYCODONE HYDROCHLORIDE 10 MG/1
10 TABLET ORAL
Status: DISCONTINUED | OUTPATIENT
Start: 2024-03-26 | End: 2024-03-26 | Stop reason: HOSPADM

## 2024-03-26 RX ORDER — PROPOFOL 10 MG/ML
INJECTION, EMULSION INTRAVENOUS CONTINUOUS PRN
Status: DISCONTINUED | OUTPATIENT
Start: 2024-03-26 | End: 2024-03-26

## 2024-03-26 RX ORDER — ONDANSETRON 2 MG/ML
4 INJECTION INTRAMUSCULAR; INTRAVENOUS EVERY 6 HOURS PRN
Status: DISCONTINUED | OUTPATIENT
Start: 2024-03-26 | End: 2024-03-26 | Stop reason: HOSPADM

## 2024-03-26 RX ORDER — NALOXONE HYDROCHLORIDE 0.4 MG/ML
0.1 INJECTION, SOLUTION INTRAMUSCULAR; INTRAVENOUS; SUBCUTANEOUS
Status: DISCONTINUED | OUTPATIENT
Start: 2024-03-26 | End: 2024-03-26 | Stop reason: HOSPADM

## 2024-03-26 RX ORDER — SODIUM CHLORIDE, SODIUM LACTATE, POTASSIUM CHLORIDE, CALCIUM CHLORIDE 600; 310; 30; 20 MG/100ML; MG/100ML; MG/100ML; MG/100ML
INJECTION, SOLUTION INTRAVENOUS CONTINUOUS
Status: DISCONTINUED | OUTPATIENT
Start: 2024-03-26 | End: 2024-03-26 | Stop reason: HOSPADM

## 2024-03-26 RX ORDER — OXYCODONE HYDROCHLORIDE 5 MG/1
5 TABLET ORAL
Status: DISCONTINUED | OUTPATIENT
Start: 2024-03-26 | End: 2024-03-26 | Stop reason: HOSPADM

## 2024-03-26 RX ADMIN — PROPOFOL 150 MCG/KG/MIN: 10 INJECTION, EMULSION INTRAVENOUS at 10:51

## 2024-03-26 RX ADMIN — LIDOCAINE HYDROCHLORIDE 20 MG: 20 INJECTION, SOLUTION INFILTRATION; PERINEURAL at 10:48

## 2024-03-26 RX ADMIN — PROPOFOL 50 MG: 10 INJECTION, EMULSION INTRAVENOUS at 10:51

## 2024-03-26 RX ADMIN — SODIUM CHLORIDE, POTASSIUM CHLORIDE, SODIUM LACTATE AND CALCIUM CHLORIDE: 600; 310; 30; 20 INJECTION, SOLUTION INTRAVENOUS at 10:50

## 2024-03-26 RX ADMIN — PROPOFOL 50 MG: 10 INJECTION, EMULSION INTRAVENOUS at 10:59

## 2024-03-26 ASSESSMENT — ACTIVITIES OF DAILY LIVING (ADL)
ADLS_ACUITY_SCORE: 35
ADLS_ACUITY_SCORE: 35

## 2024-03-26 NOTE — ANESTHESIA POSTPROCEDURE EVALUATION
[facility]    Procedure: Procedure(s):  ESOPHAGOGASTRODUODENOSCOPY, WITH BIOPSY  Esophageal Balloon Provocation Study  ESOPHAGOGASTRODUODENOSCOPY, WITH DILATION USING ANA DILATOR OVER GUIDEWIRE       Anesthesia Type:  MAC    Note:  Disposition: Outpatient   Postop Pain Control: Uneventful            Sign Out: Well controlled pain   PONV: No   Neuro/Psych: Uneventful            Sign Out: Acceptable/Baseline neuro status   Airway/Respiratory: Uneventful            Sign Out: Acceptable/Baseline resp. status   CV/Hemodynamics: Uneventful            Sign Out: Acceptable CV status; No obvious hypovolemia; No obvious fluid overload   Other NRE: NONE   DID A NON-ROUTINE EVENT OCCUR? No           Last vitals:  Vitals Value Taken Time   /83 03/26/24 1145   Temp     Pulse     Resp     SpO2 97 % 03/26/24 1147   Vitals shown include unfiled device data.    Electronically Signed By: Henna Lock MD  March 26, 2024  12:15 PM

## 2024-03-26 NOTE — H&P
Chiki BERRY [facility]  [id number]  male  58 year old      Reason for procedure/surgery: egd, endoflip possible botox    There is no problem list on file for this patient.      Past Surgical History:  No past surgical history on file.    Past Medical History: No past medical history on file.    Social History:   Social History     Tobacco Use    Smoking status: Never    Smokeless tobacco: Never   Substance Use Topics    Alcohol use: Never       Family History: No family history on file.    Allergies:   Allergies   Allergen Reactions    Cats Itching and Other (See Comments)    Atenolol Other (See Comments)     Pulse dropped to 40's- pt now taking as of 4/30    Juniper Berries Itching and Other (See Comments)       Active Medications:   No current outpatient medications on file.       Systemic Review:   CONSTITUTIONAL: NEGATIVE for fever, chills, change in weight  ENT/MOUTH: NEGATIVE for ear, mouth and throat problems  RESP: NEGATIVE for significant cough or SOB  CV: NEGATIVE for chest pain, palpitations or peripheral edema    Physical Examination:   Vital Signs: There were no vitals taken for this visit.  GENERAL: healthy, alert and no distress  NECK: no adenopathy, no asymmetry, masses, or scars  RESP: lungs clear to auscultation - no rales, rhonchi or wheezes  CV: regular rate and rhythm, normal S1 S2, no S3 or S4, no murmur, click or rub, no peripheral edema and peripheral pulses strong  ABDOMEN: soft, nontender, no hepatosplenomegaly, no masses and bowel sounds normal  MS: no gross musculoskeletal defects noted, no edema    I examined patient s dentition and informed the patient that dental injuries are a risk of any anesthetic management. No concerns were noted with this patient's dentition. . The patient has consented to proceed with the procedure.    Plan: Appropriate to proceed as scheduled.      Aiden Wiggins DO  3/26/2024    PCP:  Annalee Bird

## 2024-03-26 NOTE — ANESTHESIA CARE TRANSFER NOTE
[facility]    Procedure: Procedure(s):  ESOPHAGOGASTRODUODENOSCOPY, WITH BIOPSY  Esophageal Balloon Provocation Study  ESOPHAGOGASTRODUODENOSCOPY, WITH DILATION USING SAVDANIEL-CHUCK DILATOR OVER GUIDEWIRE       Diagnosis: Dysphagia, unspecified type [R13.10]  Diagnosis Additional Information: No value filed.    Anesthesia Type:   MAC     Note:    Oropharynx: oropharynx clear of all foreign objects  Level of Consciousness: awake      Independent Airway: airway patency satisfactory and stable  Dentition: dentition unchanged  Vital Signs Stable: post-procedure vital signs reviewed and stable  Report to RN Given: handoff report given  Patient transferred to: Phase II    Handoff Report: Identifed the Patient, Identified the Reponsible Provider, Reviewed the pertinent medical history, Discussed the surgical course, Reviewed Intra-OP anesthesia mangement and issues during anesthesia, Set expectations for post-procedure period and Allowed opportunity for questions and acknowledgement of understanding      Vitals:  Vitals Value Taken Time   BP     Temp     Pulse     Resp     SpO2 97 % 03/26/24 1125   Vitals shown include unfiled device data.    Electronically Signed By: BERT Shrestha CRNA  March 26, 2024  11:26 AM

## 2024-03-26 NOTE — TELEPHONE ENCOUNTER
Received incoming call from patient.    Patient called to inform [facility] that they were running a bit behind due to the weather. Writer called unit and they confirmed that that was ok.    Patient verbalized understanding and had no further questions.    Jacqui Kinsey RN  Endoscopy Procedure Pre Assessment RN  [phone] option 4

## 2024-03-26 NOTE — ANESTHESIA PREPROCEDURE EVALUATION
Anesthesia Pre-Procedure Evaluation    Patient: Chiki Philip   MRN: [id number] : 1965        Procedure : Procedure(s):  Esophagoscopy, gastroscopy, duodenoscopy (EGD), combined  Esophageal Balloon Provocation Study          No past medical history on file.   No past surgical history on file.   Allergies   Allergen Reactions    Cats Itching and Other (See Comments)    Atenolol Other (See Comments)     Pulse dropped to 40's- pt now taking as of     Juniper Berries Itching and Other (See Comments)      Social History     Tobacco Use    Smoking status: Never    Smokeless tobacco: Never   Substance Use Topics    Alcohol use: Never      Wt Readings from Last 1 Encounters:   23 110.7 kg (244 lb)        Anesthesia Evaluation   Pt has had prior anesthetic. Type: General.    No history of anesthetic complications       ROS/MED HX  ENT/Pulmonary:     (+)           allergic rhinitis,                             Neurologic:  - neg neurologic ROS     Cardiovascular:     (+) Dyslipidemia hypertension- -   -  - -                                      METS/Exercise Tolerance: 4 - Raking leaves, gardening    Hematologic:     (+) History of blood clots,    pt is not anticoagulated,           Musculoskeletal:   (+)  arthritis,             GI/Hepatic: Comment: dysphagia    (+) GERD,                   Renal/Genitourinary:     (+)        BPH,      Endo:       Psychiatric/Substance Use:  - neg psychiatric ROS     Infectious Disease:  - neg infectious disease ROS     Malignancy:  - neg malignancy ROS     Other:  - neg other ROS          Physical Exam    Airway        Mallampati: II   TM distance: > 3 FB   Neck ROM: full   Mouth opening: > 3 cm    Respiratory Devices and Support         Dental       (+) Minor Abnormalities - some fillings, tiny chips      Cardiovascular   cardiovascular exam normal       Rhythm and rate: regular     Pulmonary   pulmonary exam normal        breath sounds clear to auscultation  "          OUTSIDE LABS:  CBC: No results found for: \"WBC\", \"HGB\", \"HCT\", \"PLT\"  BMP: No results found for: \"NA\", \"POTASSIUM\", \"CHLORIDE\", \"CO2\", \"BUN\", \"CR\", \"GLC\"  COAGS: No results found for: \"PTT\", \"INR\", \"FIBR\"  POC: No results found for: \"BGM\", \"HCG\", \"HCGS\"  HEPATIC: No results found for: \"ALBUMIN\", \"PROTTOTAL\", \"ALT\", \"AST\", \"GGT\", \"ALKPHOS\", \"BILITOTAL\", \"BILIDIRECT\", \"FLORA\"  OTHER: No results found for: \"PH\", \"LACT\", \"A1C\", \"COCO\", \"PHOS\", \"MAG\", \"LIPASE\", \"AMYLASE\", \"TSH\", \"T4\", \"T3\", \"CRP\", \"SED\"    Anesthesia Plan    ASA Status:  3    NPO Status:  NPO Appropriate    Anesthesia Type: MAC.     - Reason for MAC: straight local not clinically adequate   Induction: Intravenous, Propofol.   Maintenance: TIVA.        Consents    Anesthesia Plan(s) and associated risks, benefits, and realistic alternatives discussed. Questions answered and patient/representative(s) expressed understanding.     - Discussed:     - Discussed with:  Patient      - Extended Intubation/Ventilatory Support Discussed: No.      - Patient is DNR/DNI Status: No     Use of blood products discussed: No .     Postoperative Care    Pain management: IV analgesics, Oral pain medications.   PONV prophylaxis: Ondansetron (or other 5HT-3), Dexamethasone or Solumedrol     Comments:               Henna Lock MD    I have reviewed the pertinent notes and labs in the chart from the past 30 days and (re)examined the patient.  Any updates or changes from those notes are reflected in this note.             # Drug Induced Platelet Defect: home medication list includes an antiplatelet medication      "

## 2024-03-26 NOTE — OP NOTE
Please see endoscopy report for full description of the procedure.     EndoFLIP directed at the LES ( 16cm (EF-322) balloon length):   Date: 3/26/24    16cm balloon  Balloon inflation Balloon pressure CSA (mm^2) DI (mm^2/mmHg) Dmin (mm) Compliance   30 (ladmark ID) 43.4  9.74 23.2    40 43.4  8.68 21.9    50 52.8  5.31 18.9    60 70  5 21.1    70           RACs, normal EGJ opening    Aiden Wiggins DO   of Medicine  Director, Esophageal Disorders Program  Division of Gastroenterology, Hepatology, and Nutrition  [facility]

## 2024-03-26 NOTE — OR NURSING
EGD with biopsies, endoflip and savary dilation to 18 mm performed under MAC, patient tolerated well. Transferred to  and report given to recovery RN.

## 2024-03-27 LAB
PATH REPORT.COMMENTS IMP SPEC: NORMAL
PATH REPORT.COMMENTS IMP SPEC: NORMAL
PATH REPORT.FINAL DX SPEC: NORMAL
PATH REPORT.GROSS SPEC: NORMAL
PATH REPORT.MICROSCOPIC SPEC OTHER STN: NORMAL
PATH REPORT.RELEVANT HX SPEC: NORMAL
PHOTO IMAGE: NORMAL

## 2024-04-29 ENCOUNTER — PATIENT OUTREACH (OUTPATIENT)
Dept: GASTROENTEROLOGY | Facility: CLINIC | Age: 59
End: 2024-04-29
Payer: COMMERCIAL

## 2024-04-29 NOTE — TELEPHONE ENCOUNTER
Returned pt call regarding getting prescription for PPI BID per recommendation following recent Endoscopy.  [facility] for follow up.    'Below are your results. There was evidence of inflammation in the small intestine. Please take your PPI twice daily and follow up with Dr. Harris.'    Pt is hoping to have proceduralist fill Rx. Provider messaged for advisement.  Writer will communicate provider response to request.      Pt uses [facility].

## 2024-05-01 ENCOUNTER — PATIENT OUTREACH (OUTPATIENT)
Dept: GASTROENTEROLOGY | Facility: CLINIC | Age: 59
End: 2024-05-01
Payer: COMMERCIAL

## 2024-05-01 NOTE — TELEPHONE ENCOUNTER
Reached back out to pt to let them know that provider recommendation.  Pt advised to follow up primary GI regarding Rx refills/adjustments.  Pt verbalized understanding and will show GI provider results note from EGD.

## 2025-03-15 ENCOUNTER — HEALTH MAINTENANCE LETTER (OUTPATIENT)
Age: 60
End: 2025-03-15

## (undated) DEVICE — ENDO BALLOON FOR ESOPHAGEAL BALLOON PROVOCATION STUDY